# Patient Record
Sex: MALE | Race: WHITE | NOT HISPANIC OR LATINO | URBAN - METROPOLITAN AREA
[De-identification: names, ages, dates, MRNs, and addresses within clinical notes are randomized per-mention and may not be internally consistent; named-entity substitution may affect disease eponyms.]

---

## 2017-12-10 ENCOUNTER — EMERGENCY (EMERGENCY)
Facility: HOSPITAL | Age: 37
LOS: 0 days | Discharge: HOME | End: 2017-12-10
Attending: EMERGENCY MEDICINE | Admitting: EMERGENCY MEDICINE
Payer: COMMERCIAL

## 2017-12-10 VITALS
SYSTOLIC BLOOD PRESSURE: 135 MMHG | TEMPERATURE: 98 F | OXYGEN SATURATION: 99 % | RESPIRATION RATE: 17 BRPM | DIASTOLIC BLOOD PRESSURE: 78 MMHG | HEART RATE: 63 BPM

## 2017-12-10 VITALS — WEIGHT: 214.95 LBS | HEIGHT: 75 IN
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
## 2017-12-10 DIAGNOSIS — S01.81XA LACERATION WITHOUT FOREIGN BODY OF OTHER PART OF HEAD, INITIAL ENCOUNTER: ICD-10-CM

## 2017-12-10 PROCEDURE — 12011 RPR F/E/E/N/L/M 2.5 CM/<: CPT

## 2017-12-10 PROCEDURE — 99283 EMERGENCY DEPT VISIT LOW MDM: CPT

## 2017-12-10 RX ORDER — CEPHALEXIN 500 MG
1 CAPSULE ORAL
Qty: 10 | Refills: 0
Start: 2017-12-10 | End: 2017-12-14

## 2017-12-10 RX ORDER — TETANUS TOXOID, REDUCED DIPHTHERIA TOXOID AND ACELLULAR PERTUSSIS VACCINE, ADSORBED 5; 2.5; 8; 8; 2.5 [IU]/.5ML; [IU]/.5ML; UG/.5ML; UG/.5ML; UG/.5ML
0.5 SUSPENSION INTRAMUSCULAR ONCE
Qty: 0 | Refills: 0 | Status: COMPLETED | OUTPATIENT
Start: 2017-12-10 | End: 2017-12-10

## 2017-12-10 RX ORDER — CEPHALEXIN 500 MG
500 CAPSULE ORAL ONCE
Qty: 0 | Refills: 0 | Status: COMPLETED | OUTPATIENT
Start: 2017-12-10 | End: 2017-12-10

## 2017-12-10 RX ADMIN — Medication 500 MILLIGRAM(S): at 13:26

## 2017-12-10 RX ADMIN — TETANUS TOXOID, REDUCED DIPHTHERIA TOXOID AND ACELLULAR PERTUSSIS VACCINE, ADSORBED 0.5 MILLILITER(S): 5; 2.5; 8; 8; 2.5 SUSPENSION INTRAMUSCULAR at 12:21

## 2017-12-10 NOTE — ED STATDOCS - ATTENDING CONTRIBUTION TO CARE
I, Evin Huerta MD,  performed the initial face to face bedside interview with this patient regarding history of present illness, review of symptoms and relevant past medical, social and family history.  I completed an independent physical examination.  I was the initial provider who evaluated this patient. I have signed out the follow up of any pending tests (i.e. labs, radiological studies) to the ACP.  I have communicated the patient’s plan of care and disposition with the ACP.  The history, relevant review of systems, past medical and surgical history, medical decision making, and physical examination was documented by the scribe in my presence and I attest to the accuracy of the documentation.    I, Evin Huerta MD, personally saw the patient with ACP.  I have personally performed a face to face diagnostic evaluation on this patient.  I have reviewed the ACP note and agree with the history, exam, and plan of care, except as noted.

## 2017-12-10 NOTE — ED STATDOCS - PROGRESS NOTE DETAILS
sutures placed under sterile fashion. Patient is feeling much better, tests/labs reviewed. case discussed with attending. OK to dc home. MAUREEN Cardenas

## 2017-12-10 NOTE — ED ADULT NURSE NOTE - OBJECTIVE STATEMENT
Pt slipped on ice and comes to ED with lac to underside of chin. Denies LOC, dizziness, or weakness.

## 2017-12-10 NOTE — ED ADULT TRIAGE NOTE - CHIEF COMPLAINT QUOTE
chin laceration sustained last night, pt slipped on ice and struck chin.  no LOC, no anticoagulatns.

## 2017-12-10 NOTE — ED STATDOCS - PHYSICAL EXAMINATION
GEN: AOX3, NAD. HEENT: Throat clear. Head NC/AT. NECK: Supple, No JVD. FROM. C-spine non-tender. CV:S1S2, RRR, LUNGS: CTA/b/l, no w/r/r. ABD: Soft, NT/ND, no rebound, no guarding. No CVAT. EXT: No e/c/c. 2+ distal pulses. SKIN: No rashes. CHIN: +1.5cm superficial linear LAC noted chin area. No bleeding. NVI. No FBs. NEURO: No focal deficits. CN II-XII intact. FROM. 5/5 motor and sensory. MAUREEN Cardenas

## 2017-12-10 NOTE — ED STATDOCS - OBJECTIVE STATEMENT
36 y/o male with no PMHx presents to the ED c/o chin laceration. Pt was drinking fell, tripped yesterday while walking and landed on his chin with laceration. No active bleeding to site. Pt remembers this incident, LOC, nausea, N/V.

## 2018-02-04 ENCOUNTER — EMERGENCY (EMERGENCY)
Facility: HOSPITAL | Age: 38
LOS: 0 days | Discharge: HOME | End: 2018-02-04
Attending: EMERGENCY MEDICINE | Admitting: EMERGENCY MEDICINE
Payer: COMMERCIAL

## 2018-02-04 VITALS
HEART RATE: 85 BPM | OXYGEN SATURATION: 100 % | RESPIRATION RATE: 18 BRPM | SYSTOLIC BLOOD PRESSURE: 125 MMHG | WEIGHT: 214.95 LBS | TEMPERATURE: 99 F | HEIGHT: 76 IN | DIASTOLIC BLOOD PRESSURE: 88 MMHG

## 2018-02-04 VITALS
SYSTOLIC BLOOD PRESSURE: 117 MMHG | DIASTOLIC BLOOD PRESSURE: 86 MMHG | OXYGEN SATURATION: 100 % | HEART RATE: 81 BPM | RESPIRATION RATE: 17 BRPM

## 2018-02-04 DIAGNOSIS — S61.042A PUNCTURE WOUND WITH FOREIGN BODY OF LEFT THUMB WITHOUT DAMAGE TO NAIL, INITIAL ENCOUNTER: ICD-10-CM
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
## 2018-02-04 PROCEDURE — 73120 X-RAY EXAM OF HAND: CPT | Mod: 26,LT

## 2018-02-04 PROCEDURE — 99284 EMERGENCY DEPT VISIT MOD MDM: CPT | Mod: 25

## 2018-02-04 NOTE — ED PROVIDER NOTE - PROGRESS NOTE DETAILS
36 yo male with nailgun injury to left thumb; distal tip injury not involving nail.  no bleeding. n/v intact.  xray shows no bony involvement.

## 2018-02-04 NOTE — ED PROVIDER NOTE - MUSCULOSKELETAL NEGATIVE STATEMENT, MLM
no back pain, no gout, no musculoskeletal pain, no neck pain, and no weakness. + left thumb puncture wound

## 2018-02-04 NOTE — ED ADULT NURSE NOTE - OBJECTIVE STATEMENT
37 year old male presenting to the ED with a nail impaled through the palmar surface of his left first finger. Patient states that he was using a nail gun, and accidently shot a nail through his left first finger. Patient has a 5 in nail at his left first finger tip, distal to the nailbed without nailbed involvement, going diagonally through the palmar surface of the finger. Patient has full range of motion and circulation/motor/sensory function intact. Patient states tetanus UTD. Currently on antibiotics therapy, Amoxicillin day two. No bleeding at site, no pain to site.

## 2018-02-04 NOTE — ED PROVIDER NOTE - MUSCULOSKELETAL, MLM
+ nail at the Left thumb at the distal phalanx. no active bleeding. NVI. Spine appears normal, range of motion is not limited, no muscle or joint tenderness

## 2018-02-04 NOTE — ED ADULT NURSE NOTE - CHPI ED SYMPTOMS NEG
no purulent drainage/no redness/no vomiting/no red streaks/no bleeding/no pain/no drainage/no fever/no bleeding at site/no chills

## 2018-02-04 NOTE — ED ADULT NURSE REASSESSMENT NOTE - NS ED NURSE REASSESS COMMENT FT1
2315: Patient is in [facility] with MAUREEN Mitchell having the nail removed. No needs at this time, will continue to monitor/reassess.

## 2018-02-04 NOTE — ED PROVIDER NOTE - OBJECTIVE STATEMENT
Patient is a 38 yo amle who presented with a nail in his left thumb an hour PTA. Pt was using a nail gun and sustained the injury, denies any other complaint. Tetanus is UTD. Pt is currently on amoxicillin for URI, day #2 today. Patient is a 38 yo amle who presented with a nail in his left thumb an hour PTA. Pt was using a nail gun and sustained the injury, denies any other complaint. Tetanus is UTD. Pt is currently on augmentin for sinusitis, day 2/10

## 2018-12-26 ENCOUNTER — EMERGENCY (EMERGENCY)
Facility: HOSPITAL | Age: 38
LOS: 0 days | Discharge: HOME | End: 2018-12-26
Attending: EMERGENCY MEDICINE | Admitting: EMERGENCY MEDICINE
Payer: COMMERCIAL

## 2018-12-26 VITALS — WEIGHT: 229.94 LBS | HEIGHT: 76 IN

## 2018-12-26 VITALS
HEART RATE: 78 BPM | DIASTOLIC BLOOD PRESSURE: 83 MMHG | OXYGEN SATURATION: 100 % | RESPIRATION RATE: 17 BRPM | TEMPERATURE: 98 F | SYSTOLIC BLOOD PRESSURE: 156 MMHG

## 2018-12-26 DIAGNOSIS — S02.2XXA FRACTURE OF NASAL BONES, INITIAL ENCOUNTER FOR CLOSED FRACTURE: ICD-10-CM
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
## 2018-12-26 DIAGNOSIS — R04.0 EPISTAXIS: ICD-10-CM
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
## 2018-12-26 PROCEDURE — 99284 EMERGENCY DEPT VISIT MOD MDM: CPT

## 2018-12-26 PROCEDURE — 70160 X-RAY EXAM OF NASAL BONES: CPT | Mod: 26

## 2018-12-26 NOTE — ED ADULT TRIAGE NOTE - CHIEF COMPLAINT QUOTE
pt ambulatory to ED for eval of nasal pain and epistaxis since last night when he walked into patio door. denies LOC or use of anticoagulants.

## 2018-12-26 NOTE — ED STATDOCS - PROGRESS NOTE DETAILS
Patient seen and evaluated.  +nasal bone fracture.  Reviewed care at home, patient sees Dr. Hui and will follow up in the office -Jhonathan Kramer PA-C

## 2018-12-26 NOTE — ED ADULT NURSE NOTE - NSIMPLEMENTINTERV_GEN_ALL_ED
Implemented All Universal Safety Interventions:  [place] to call system. Call bell, personal items and telephone within reach. Instruct patient to call for assistance. Room bathroom lighting operational. Non-slip footwear when patient is off stretcher. Physically safe environment: no spills, clutter or unnecessary equipment. Stretcher in lowest position, wheels locked, appropriate side rails in place.

## 2018-12-26 NOTE — ED STATDOCS - OBJECTIVE STATEMENT
39 y/o male with no PMHx presents to the ED c/o epistaxis. Pt notes he was working outside on his patio when he walked into the door with the headlight wrapped on his forehead. Pt now with epistaxis and nasal pain. Not on blood thinners.

## 2018-12-26 NOTE — ED STATDOCS - ENMT, MLM
+nasal bridge TTP and edema, no active epistaxis, no crepitus, no septal hematoma. Mouth with normal mucosa  Throat has no vesicles, no oropharyngeal exudates and uvula is midline.

## 2018-12-26 NOTE — ED STATDOCS - MEDICAL DECISION MAKING DETAILS
Pt with nasal bone fracture.  No signs of septal hematoma.  Supportive care, f/u with ENT.  No nose blowing, can use nasal saline/Afrin as needed (afrin no more than 3 days).

## 2019-08-26 ENCOUNTER — OUTPATIENT (OUTPATIENT)
Dept: OUTPATIENT SERVICES | Facility: HOSPITAL | Age: 39
LOS: 1 days | End: 2019-08-26
Payer: COMMERCIAL

## 2019-08-26 DIAGNOSIS — J32.9 CHRONIC SINUSITIS, UNSPECIFIED: ICD-10-CM

## 2019-08-26 DIAGNOSIS — Z98.890 OTHER SPECIFIED POSTPROCEDURAL STATES: Chronic | ICD-10-CM

## 2019-08-26 LAB
BASOPHILS # BLD AUTO: 0.03 K/UL — SIGNIFICANT CHANGE UP (ref 0–0.2)
BASOPHILS NFR BLD AUTO: 0.6 % — SIGNIFICANT CHANGE UP (ref 0–2)
EOSINOPHIL # BLD AUTO: 0.09 K/UL — SIGNIFICANT CHANGE UP (ref 0–0.5)
EOSINOPHIL NFR BLD AUTO: 1.9 % — SIGNIFICANT CHANGE UP (ref 0–6)
HCT VFR BLD CALC: 44.3 % — SIGNIFICANT CHANGE UP (ref 39–50)
HGB BLD-MCNC: 14.6 G/DL — SIGNIFICANT CHANGE UP (ref 13–17)
IMM GRANULOCYTES NFR BLD AUTO: 0.8 % — SIGNIFICANT CHANGE UP (ref 0–1.5)
LYMPHOCYTES # BLD AUTO: 1.34 K/UL — SIGNIFICANT CHANGE UP (ref 1–3.3)
LYMPHOCYTES # BLD AUTO: 28 % — SIGNIFICANT CHANGE UP (ref 13–44)
MCHC RBC-ENTMCNC: 27.9 PG — SIGNIFICANT CHANGE UP (ref 27–34)
MCHC RBC-ENTMCNC: 33 GM/DL — SIGNIFICANT CHANGE UP (ref 32–36)
MCV RBC AUTO: 84.7 FL — SIGNIFICANT CHANGE UP (ref 80–100)
MONOCYTES # BLD AUTO: 0.34 K/UL — SIGNIFICANT CHANGE UP (ref 0–0.9)
MONOCYTES NFR BLD AUTO: 7.1 % — SIGNIFICANT CHANGE UP (ref 2–14)
NEUTROPHILS # BLD AUTO: 2.95 K/UL — SIGNIFICANT CHANGE UP (ref 1.8–7.4)
NEUTROPHILS NFR BLD AUTO: 61.6 % — SIGNIFICANT CHANGE UP (ref 43–77)
PLATELET # BLD AUTO: 264 K/UL — SIGNIFICANT CHANGE UP (ref 150–400)
RBC # BLD: 5.23 M/UL — SIGNIFICANT CHANGE UP (ref 4.2–5.8)
RBC # FLD: 12.3 % — SIGNIFICANT CHANGE UP (ref 10.3–14.5)
WBC # BLD: 4.79 K/UL — SIGNIFICANT CHANGE UP (ref 3.8–10.5)
WBC # FLD AUTO: 4.79 K/UL — SIGNIFICANT CHANGE UP (ref 3.8–10.5)

## 2019-08-26 PROCEDURE — 93010 ELECTROCARDIOGRAM REPORT: CPT

## 2019-08-26 PROCEDURE — 93005 ELECTROCARDIOGRAM TRACING: CPT

## 2019-08-26 PROCEDURE — 71046 X-RAY EXAM CHEST 2 VIEWS: CPT

## 2019-08-26 PROCEDURE — 71046 X-RAY EXAM CHEST 2 VIEWS: CPT | Mod: 26

## 2019-08-26 PROCEDURE — 36415 COLL VENOUS BLD VENIPUNCTURE: CPT

## 2019-08-26 PROCEDURE — 85025 COMPLETE CBC W/AUTO DIFF WBC: CPT

## 2019-08-26 NOTE — ASU PATIENT PROFILE, ADULT - VISION (WITH CORRECTIVE LENSES IF THE PATIENT USUALLY WEARS THEM):
glasses for night driving/Normal vision: sees adequately in most situations; can see medication labels, newsprint

## 2019-08-26 NOTE — CHART NOTE - NSCHARTNOTEFT_GEN_A_CORE
Vital signs  Pulse 77  Temperature 98.6  Respirations 16  SpO2 100%  B/P 133/89    Plan   1. NPO after midnight  2. Drink a quart of extra  fluids the day before your surgery.  3. Medical optimization for surgery with Dr. Castellanos  4. CBC sent to lab  5. EKG and Chest x- ray done  6. Informed that he needed someone to pick him up after surgery.

## 2019-08-27 DIAGNOSIS — J32.9 CHRONIC SINUSITIS, UNSPECIFIED: ICD-10-CM

## 2019-09-06 ENCOUNTER — OUTPATIENT (OUTPATIENT)
Dept: INPATIENT UNIT | Facility: HOSPITAL | Age: 39
LOS: 1 days | Discharge: HOME | End: 2019-09-06
Payer: COMMERCIAL

## 2019-09-06 ENCOUNTER — RESULT REVIEW (OUTPATIENT)
Age: 39
End: 2019-09-06

## 2019-09-06 VITALS
TEMPERATURE: 98 F | HEART RATE: 60 BPM | OXYGEN SATURATION: 100 % | DIASTOLIC BLOOD PRESSURE: 88 MMHG | SYSTOLIC BLOOD PRESSURE: 135 MMHG | RESPIRATION RATE: 18 BRPM

## 2019-09-06 VITALS
TEMPERATURE: 98 F | RESPIRATION RATE: 15 BRPM | SYSTOLIC BLOOD PRESSURE: 125 MMHG | DIASTOLIC BLOOD PRESSURE: 84 MMHG | WEIGHT: 244.05 LBS | OXYGEN SATURATION: 98 % | HEART RATE: 68 BPM

## 2019-09-06 DIAGNOSIS — J32.9 CHRONIC SINUSITIS, UNSPECIFIED: ICD-10-CM

## 2019-09-06 DIAGNOSIS — Z98.890 OTHER SPECIFIED POSTPROCEDURAL STATES: Chronic | ICD-10-CM

## 2019-09-06 PROCEDURE — C1889: CPT

## 2019-09-06 PROCEDURE — 88300 SURGICAL PATH GROSS: CPT | Mod: 26

## 2019-09-06 PROCEDURE — 88300 SURGICAL PATH GROSS: CPT

## 2019-09-06 RX ORDER — CELECOXIB 200 MG/1
200 CAPSULE ORAL ONCE
Refills: 0 | Status: COMPLETED | OUTPATIENT
Start: 2019-09-06 | End: 2019-09-06

## 2019-09-06 RX ORDER — AMOXICILLIN 250 MG/5ML
1 SUSPENSION, RECONSTITUTED, ORAL (ML) ORAL
Qty: 0 | Refills: 0 | DISCHARGE

## 2019-09-06 RX ORDER — ACETAMINOPHEN 500 MG
975 TABLET ORAL ONCE
Refills: 0 | Status: COMPLETED | OUTPATIENT
Start: 2019-09-06 | End: 2019-09-06

## 2019-09-06 RX ORDER — FAMOTIDINE 10 MG/ML
20 INJECTION INTRAVENOUS ONCE
Refills: 0 | Status: COMPLETED | OUTPATIENT
Start: 2019-09-06 | End: 2019-09-06

## 2019-09-06 RX ORDER — METOCLOPRAMIDE HCL 10 MG
10 TABLET ORAL ONCE
Refills: 0 | Status: COMPLETED | OUTPATIENT
Start: 2019-09-06 | End: 2019-09-06

## 2019-09-06 RX ADMIN — CELECOXIB 200 MILLIGRAM(S): 200 CAPSULE ORAL at 15:24

## 2019-09-06 RX ADMIN — Medication 975 MILLIGRAM(S): at 15:24

## 2019-09-06 RX ADMIN — CELECOXIB 200 MILLIGRAM(S): 200 CAPSULE ORAL at 15:25

## 2019-09-06 RX ADMIN — Medication 975 MILLIGRAM(S): at 15:25

## 2019-09-06 RX ADMIN — FAMOTIDINE 20 MILLIGRAM(S): 10 INJECTION INTRAVENOUS at 15:24

## 2019-09-06 RX ADMIN — Medication 10 MILLIGRAM(S): at 15:24

## 2019-09-06 NOTE — ASU PATIENT PROFILE, ADULT - MENTAL HEALTH CONDITIONS/SYMPTOMS, PROFILE
Patient position supine. Patient prepped and draped per unit standard. Safety straps applied: Yes
Procedure completed with out difficulty pt tolerated well dressing to site dry and intact. Report called to [facility] on floor to resume care of pt.   Will transport back to room 
Procedure start tunnelled dialysis cath placement
none

## 2019-09-06 NOTE — BRIEF OPERATIVE NOTE - NSICDXBRIEFPROCEDURE_GEN_ALL_CORE_FT
PROCEDURES:  Repair, nasal valve 06-Sep-2019 16:06:43  Fracisco Hui  Septoplasty with turbinoplasty 06-Sep-2019 16:05:48  Fracisco Hui

## 2019-09-06 NOTE — BRIEF OPERATIVE NOTE - NSICDXBRIEFPOSTOP_GEN_ALL_CORE_FT
POST-OP DIAGNOSIS:  Nasal valve collapse 06-Sep-2019 16:07:05  Fracisco Hui  Deviated nasal septum 06-Sep-2019 16:06:18  Fracisco Hui  Nasal turbinate hypertrophy 06-Sep-2019 16:06:26  Fracisco Hui

## 2019-09-06 NOTE — BRIEF OPERATIVE NOTE - NSICDXBRIEFPREOP_GEN_ALL_CORE_FT
PRE-OP DIAGNOSIS:  Deviated nasal septum 06-Sep-2019 16:06:01  Fracisco Hui  Nasal turbinate hypertrophy 06-Sep-2019 16:06:13  Fracisco Hui  Nasal valve collapse 06-Sep-2019 16:06:59  Fracisco Hui

## 2019-09-11 DIAGNOSIS — J34.3 HYPERTROPHY OF NASAL TURBINATES: ICD-10-CM

## 2019-09-11 DIAGNOSIS — J34.89 OTHER SPECIFIED DISORDERS OF NOSE AND NASAL SINUSES: ICD-10-CM

## 2019-09-11 DIAGNOSIS — J34.2 DEVIATED NASAL SEPTUM: ICD-10-CM

## 2019-09-11 DIAGNOSIS — J45.909 UNSPECIFIED ASTHMA, UNCOMPLICATED: ICD-10-CM

## 2020-07-29 NOTE — ASU PATIENT PROFILE, ADULT - NSSUBSTANCEUSE_GEN_ALL_CORE_SD
7/29/20 3:39 pm spoke w/ family child is better and saw peds endocrinology this AM MPopcun PNP
caffeine

## 2023-11-09 NOTE — ED PROCEDURE NOTE - LENGTH OF LACERATION
Thank you for choosing [facility]!    DR. COLEMAN[facility]S CLINIC LOCATIONS:     [place]  TRIAGE LINE: [phone] [address] [facility] APPOINTMENTS: [phone]   [place] [address] RADIOLOGY: [phone]   (Mondays & Tuesdays) HAND THERAPY: [phone]    PHYSICAL THERAPY: [phone]   [place] BILLING QUESTIONS: [phone] [address] FAX: [phone]   [place] [address]    (Thursdays & Fridays)        1.5

## 2023-12-01 NOTE — ED PROVIDER NOTE - EYES NEGATIVE STATEMENT, MLM
Render Risk Assessment In Note?: no
Detail Level: Simple
Additional Notes: -will send in magic wart paste through [facility] \\n-pt will apply once daily under bandaid occlusion x 6 weeks
no discharge, no irritation, no pain, no redness, and no visual changes.

## 2025-03-20 NOTE — BRIEF OPERATIVE NOTE - ELECTIVE PROCEDURE
[facility]  GERIATRICS [address] [place] [address]   Scheduled Appointment: 04/11/2025